# Patient Record
Sex: FEMALE | Race: WHITE | NOT HISPANIC OR LATINO | URBAN - METROPOLITAN AREA
[De-identification: names, ages, dates, MRNs, and addresses within clinical notes are randomized per-mention and may not be internally consistent; named-entity substitution may affect disease eponyms.]

---

## 2022-12-08 ENCOUNTER — LAB OUTSIDE AN ENCOUNTER (OUTPATIENT)
Dept: URBAN - METROPOLITAN AREA CLINIC 113 | Facility: CLINIC | Age: 22
End: 2022-12-08

## 2022-12-08 ENCOUNTER — WEB ENCOUNTER (OUTPATIENT)
Dept: URBAN - METROPOLITAN AREA CLINIC 113 | Facility: CLINIC | Age: 22
End: 2022-12-08

## 2022-12-08 ENCOUNTER — OFFICE VISIT (OUTPATIENT)
Dept: URBAN - METROPOLITAN AREA CLINIC 113 | Facility: CLINIC | Age: 22
End: 2022-12-08
Payer: OTHER GOVERNMENT

## 2022-12-08 VITALS
TEMPERATURE: 97.3 F | WEIGHT: 83 LBS | DIASTOLIC BLOOD PRESSURE: 86 MMHG | SYSTOLIC BLOOD PRESSURE: 123 MMHG | HEART RATE: 119 BPM | BODY MASS INDEX: 14.71 KG/M2 | HEIGHT: 63 IN | RESPIRATION RATE: 16 BRPM

## 2022-12-08 DIAGNOSIS — R10.84 GENERALIZED ABDOMINAL PAIN: ICD-10-CM

## 2022-12-08 DIAGNOSIS — R63.4 WEIGHT LOSS: ICD-10-CM

## 2022-12-08 PROCEDURE — 99204 OFFICE O/P NEW MOD 45 MIN: CPT | Performed by: INTERNAL MEDICINE

## 2022-12-08 RX ORDER — MIRTAZAPINE 15 MG/1
1 TABLET AT BEDTIME TABLET, FILM COATED ORAL ONCE A DAY
Qty: 30 | Refills: 3 | OUTPATIENT
Start: 2022-12-08

## 2022-12-08 RX ORDER — HYOSCYAMINE SULFATE 0.12 MG/1
1 TABLET UNDER THE TONGUE AND ALLOW TO DISSOLVE  AS NEEDED TABLET, ORALLY DISINTEGRATING ORAL
Qty: 60 | Refills: 3 | OUTPATIENT
Start: 2022-12-08 | End: 2023-04-07

## 2022-12-09 ENCOUNTER — TELEPHONE ENCOUNTER (OUTPATIENT)
Dept: URBAN - METROPOLITAN AREA SURGERY CENTER 30 | Facility: SURGERY CENTER | Age: 22
End: 2022-12-09

## 2022-12-09 LAB
A/G RATIO: 0.9
ABSOLUTE BASOPHILS: 30
ABSOLUTE EOSINOPHILS: 23
ABSOLUTE LYMPHOCYTES: 653
ABSOLUTE MONOCYTES: 600
ABSOLUTE NEUTROPHILS: 6195
ALBUMIN: 3.5
ALKALINE PHOSPHATASE: 75
ALT (SGPT): 6
AST (SGOT): 9
BASOPHILS: 0.4
BILIRUBIN, TOTAL: 0.3
BUN/CREATININE RATIO: (no result)
BUN: 11
CALCIUM: 9.1
CARBON DIOXIDE, TOTAL: 27
CBC MORPHOLOGY: (no result)
CHLORIDE: 99
CREATININE: 0.68
EGFR: 126
EOSINOPHILS: 0.3
GLOBULIN, TOTAL: 3.9
GLUCOSE: 92
HEMATOCRIT: 27.1
HEMOGLOBIN: 8.1
LYMPHOCYTES: 8.7
MCH: 20.7
MCHC: 29.9
MCV: 69.3
MONOCYTES: 8
MPV: 9.1
NEUTROPHILS: 82.6
PLATELET COUNT: 529
POTASSIUM: 4.6
PROTEIN, TOTAL: 7.4
RDW: 16.3
RED BLOOD CELL COUNT: 3.91
SODIUM: 135
TSH: 4.39
WHITE BLOOD CELL COUNT: 7.5

## 2022-12-14 ENCOUNTER — TELEPHONE ENCOUNTER (OUTPATIENT)
Dept: URBAN - METROPOLITAN AREA CLINIC 113 | Facility: CLINIC | Age: 22
End: 2022-12-14

## 2022-12-23 ENCOUNTER — LAB OUTSIDE AN ENCOUNTER (OUTPATIENT)
Dept: URBAN - METROPOLITAN AREA CLINIC 113 | Facility: CLINIC | Age: 22
End: 2022-12-23

## 2022-12-23 ENCOUNTER — TELEPHONE ENCOUNTER (OUTPATIENT)
Dept: URBAN - METROPOLITAN AREA CLINIC 113 | Facility: CLINIC | Age: 22
End: 2022-12-23

## 2022-12-30 ENCOUNTER — WEB ENCOUNTER (OUTPATIENT)
Dept: URBAN - METROPOLITAN AREA SURGERY CENTER 25 | Facility: SURGERY CENTER | Age: 22
End: 2022-12-30

## 2023-01-04 ENCOUNTER — TELEPHONE ENCOUNTER (OUTPATIENT)
Dept: URBAN - METROPOLITAN AREA CLINIC 113 | Facility: CLINIC | Age: 23
End: 2023-01-04

## 2023-01-04 ENCOUNTER — CLAIMS CREATED FROM THE CLAIM WINDOW (OUTPATIENT)
Dept: URBAN - METROPOLITAN AREA CLINIC 4 | Facility: CLINIC | Age: 23
End: 2023-01-04
Payer: OTHER GOVERNMENT

## 2023-01-04 ENCOUNTER — OFFICE VISIT (OUTPATIENT)
Dept: URBAN - METROPOLITAN AREA SURGERY CENTER 25 | Facility: SURGERY CENTER | Age: 23
End: 2023-01-04
Payer: OTHER GOVERNMENT

## 2023-01-04 DIAGNOSIS — K63.89 OTHER SPECIFIED DISEASES OF INTESTINE: ICD-10-CM

## 2023-01-04 DIAGNOSIS — R10.84 ABDOMINAL PAIN, GENERALIZED: ICD-10-CM

## 2023-01-04 DIAGNOSIS — K56.699 OTHER INTESTINAL OBSTRUCTION UNSPECIFIED AS TO PARTIAL VERSUS COMPLETE OBSTRUCTION: ICD-10-CM

## 2023-01-04 DIAGNOSIS — R93.3 ABN FINDINGS-GI TRACT: ICD-10-CM

## 2023-01-04 PROCEDURE — 88305 TISSUE EXAM BY PATHOLOGIST: CPT | Performed by: PATHOLOGY

## 2023-01-04 PROCEDURE — G8907 PT DOC NO EVENTS ON DISCHARG: HCPCS | Performed by: INTERNAL MEDICINE

## 2023-01-04 PROCEDURE — 45380 COLONOSCOPY AND BIOPSY: CPT | Performed by: INTERNAL MEDICINE

## 2023-01-04 RX ORDER — MIRTAZAPINE 15 MG/1
1 TABLET AT BEDTIME TABLET, FILM COATED ORAL ONCE A DAY
Qty: 30 | Refills: 3 | Status: ACTIVE | COMMUNITY
Start: 2022-12-08

## 2023-01-04 RX ORDER — HYOSCYAMINE SULFATE 0.12 MG/1
1 TABLET UNDER THE TONGUE AND ALLOW TO DISSOLVE  AS NEEDED TABLET, ORALLY DISINTEGRATING ORAL
Qty: 60 | Refills: 3 | Status: ACTIVE | COMMUNITY
Start: 2022-12-08 | End: 2023-04-07

## 2023-01-06 ENCOUNTER — OFFICE VISIT (OUTPATIENT)
Dept: URBAN - METROPOLITAN AREA CLINIC 113 | Facility: CLINIC | Age: 23
End: 2023-01-06

## 2023-01-15 ENCOUNTER — TELEPHONE ENCOUNTER (OUTPATIENT)
Dept: URBAN - METROPOLITAN AREA CLINIC 113 | Facility: CLINIC | Age: 23
End: 2023-01-15

## 2023-01-25 ENCOUNTER — OFFICE VISIT (OUTPATIENT)
Dept: URBAN - METROPOLITAN AREA CLINIC 107 | Facility: CLINIC | Age: 23
End: 2023-01-25
Payer: OTHER GOVERNMENT

## 2023-01-25 VITALS
WEIGHT: 85 LBS | DIASTOLIC BLOOD PRESSURE: 91 MMHG | RESPIRATION RATE: 16 BRPM | HEART RATE: 126 BPM | BODY MASS INDEX: 15.06 KG/M2 | TEMPERATURE: 96.6 F | HEIGHT: 63 IN | SYSTOLIC BLOOD PRESSURE: 133 MMHG

## 2023-01-25 DIAGNOSIS — R93.5 ABNORMAL CT OF THE ABDOMEN: ICD-10-CM

## 2023-01-25 DIAGNOSIS — R10.84 GENERALIZED ABDOMINAL PAIN: ICD-10-CM

## 2023-01-25 DIAGNOSIS — R63.4 WEIGHT LOSS: ICD-10-CM

## 2023-01-25 PROCEDURE — 99214 OFFICE O/P EST MOD 30 MIN: CPT | Performed by: INTERNAL MEDICINE

## 2023-01-25 RX ORDER — MIRTAZAPINE 15 MG/1
1 TABLET AT BEDTIME TABLET, FILM COATED ORAL ONCE A DAY
Qty: 30 | Refills: 3 | Status: ACTIVE | COMMUNITY
Start: 2022-12-08

## 2023-01-25 RX ORDER — HYOSCYAMINE SULFATE 0.12 MG/1
1 TABLET UNDER THE TONGUE AND ALLOW TO DISSOLVE  AS NEEDED TABLET, ORALLY DISINTEGRATING ORAL
Qty: 60 | Refills: 3 | Status: ACTIVE | COMMUNITY
Start: 2022-12-08 | End: 2023-04-07

## 2023-01-25 NOTE — HPI-TODAY'S VISIT:
Patient returns today in follow-up.  She has been seen for generalized abdominal pain some nausea vomiting and weight loss.  There was concern for some anorexia.  However work-up demonstrated inflammatory process in the right lower quadrant.  She had hydronephrosis.  She has been seen by Dr. Todd and it sounds like ureteral stents are planned upcoming.  Colonoscopy was performed showing some nonspecific inflammation.  Biopsies from the terminal ileum and colon were obtained and did not show any evidence of Crohn's disease.  She is up 2 pounds since her last visit.  However she continues to have pain and decreased intake.

## 2023-01-25 NOTE — PHYSICAL EXAM GASTROINTESTINAL
Abdomen is soft, mildly tender RLQ>[id number], nondistended , no rebound or guarding. No organomegaly appreciated

## 2023-02-02 ENCOUNTER — CLAIMS CREATED FROM THE CLAIM WINDOW (OUTPATIENT)
Dept: URBAN - METROPOLITAN AREA MEDICAL CENTER 19 | Facility: MEDICAL CENTER | Age: 23
End: 2023-02-02
Payer: OTHER GOVERNMENT

## 2023-02-02 DIAGNOSIS — R93.3 ABN FINDINGS-GI TRACT: ICD-10-CM

## 2023-02-02 DIAGNOSIS — R63.4 ABNORMAL INTENTIONAL WEIGHT LOSS: ICD-10-CM

## 2023-02-02 DIAGNOSIS — R19.7 ACUTE DIARRHEA: ICD-10-CM

## 2023-02-02 PROCEDURE — 99222 1ST HOSP IP/OBS MODERATE 55: CPT | Performed by: INTERNAL MEDICINE

## 2023-02-02 PROCEDURE — 99254 IP/OBS CNSLTJ NEW/EST MOD 60: CPT | Performed by: INTERNAL MEDICINE

## 2023-02-04 ENCOUNTER — CLAIMS CREATED FROM THE CLAIM WINDOW (OUTPATIENT)
Dept: URBAN - METROPOLITAN AREA MEDICAL CENTER 19 | Facility: MEDICAL CENTER | Age: 23
End: 2023-02-04
Payer: OTHER GOVERNMENT

## 2023-02-04 DIAGNOSIS — N13.30 HYDRONEPHROSIS: ICD-10-CM

## 2023-02-04 DIAGNOSIS — R10.84 GENERALIZED ABDOMINAL PAIN: ICD-10-CM

## 2023-02-04 DIAGNOSIS — R93.3 ABN FINDINGS-GI TRACT: ICD-10-CM

## 2023-02-04 DIAGNOSIS — D50.9 ANEMIA: ICD-10-CM

## 2023-02-04 PROCEDURE — 99232 SBSQ HOSP IP/OBS MODERATE 35: CPT | Performed by: INTERNAL MEDICINE

## 2023-02-05 ENCOUNTER — CLAIMS CREATED FROM THE CLAIM WINDOW (OUTPATIENT)
Dept: URBAN - METROPOLITAN AREA MEDICAL CENTER 19 | Facility: MEDICAL CENTER | Age: 23
End: 2023-02-05
Payer: OTHER GOVERNMENT

## 2023-02-05 DIAGNOSIS — D50.9 ANEMIA: ICD-10-CM

## 2023-02-05 DIAGNOSIS — N13.30 HYDRONEPHROSIS: ICD-10-CM

## 2023-02-05 DIAGNOSIS — R10.31 RLQ ABDOMINAL PAIN: ICD-10-CM

## 2023-02-05 DIAGNOSIS — R93.3 ABN FINDINGS-GI TRACT: ICD-10-CM

## 2023-02-05 PROCEDURE — 99232 SBSQ HOSP IP/OBS MODERATE 35: CPT | Performed by: INTERNAL MEDICINE

## 2023-02-06 ENCOUNTER — CLAIMS CREATED FROM THE CLAIM WINDOW (OUTPATIENT)
Dept: URBAN - METROPOLITAN AREA MEDICAL CENTER 19 | Facility: MEDICAL CENTER | Age: 23
End: 2023-02-06
Payer: OTHER GOVERNMENT

## 2023-02-06 DIAGNOSIS — N13.30 ACQUIRED HYDRONEPHROSIS: ICD-10-CM

## 2023-02-06 DIAGNOSIS — K52.89 (LYMPHOCYTIC) MICROSCOPIC COLITIS: ICD-10-CM

## 2023-02-06 DIAGNOSIS — D50.9 ANEMIA: ICD-10-CM

## 2023-02-06 PROCEDURE — 99232 SBSQ HOSP IP/OBS MODERATE 35: CPT | Performed by: STUDENT IN AN ORGANIZED HEALTH CARE EDUCATION/TRAINING PROGRAM

## 2023-02-07 ENCOUNTER — CLAIMS CREATED FROM THE CLAIM WINDOW (OUTPATIENT)
Dept: URBAN - METROPOLITAN AREA MEDICAL CENTER 19 | Facility: MEDICAL CENTER | Age: 23
End: 2023-02-07
Payer: OTHER GOVERNMENT

## 2023-02-07 DIAGNOSIS — D50.9 ANEMIA: ICD-10-CM

## 2023-02-07 DIAGNOSIS — R93.3 ABNORMAL FINDINGS ON DIAGNOSTIC IMAGING OF OTHER PARTS OF DIGESTIVE TRACT: ICD-10-CM

## 2023-02-07 DIAGNOSIS — N13.30 HYDRONEPHROSIS: ICD-10-CM

## 2023-02-07 PROCEDURE — 99232 SBSQ HOSP IP/OBS MODERATE 35: CPT | Performed by: INTERNAL MEDICINE

## 2023-02-09 ENCOUNTER — CLAIMS CREATED FROM THE CLAIM WINDOW (OUTPATIENT)
Dept: URBAN - METROPOLITAN AREA MEDICAL CENTER 19 | Facility: MEDICAL CENTER | Age: 23
End: 2023-02-09
Payer: OTHER GOVERNMENT

## 2023-02-09 DIAGNOSIS — N13.30 HYDRONEPHROSIS: ICD-10-CM

## 2023-02-09 DIAGNOSIS — R93.3 ABNORMAL FINDINGS ON DIAGNOSTIC IMAGING OF OTHER PARTS OF DIGESTIVE TRACT: ICD-10-CM

## 2023-02-09 DIAGNOSIS — R63.6 UNDERWEIGHT: ICD-10-CM

## 2023-02-09 DIAGNOSIS — K50.018 CROHN'S DISEASE OF DUODENUM WITH OTHER COMPLICATION: ICD-10-CM

## 2023-02-09 DIAGNOSIS — R10.813 RLQ ABDOMINAL TENDERNESS: ICD-10-CM

## 2023-02-09 DIAGNOSIS — D50.9 IRON DEFICIENCY ANEMIA, UNSPECIFIED: ICD-10-CM

## 2023-02-09 PROCEDURE — 99232 SBSQ HOSP IP/OBS MODERATE 35: CPT | Performed by: INTERNAL MEDICINE

## 2023-02-10 ENCOUNTER — CLAIMS CREATED FROM THE CLAIM WINDOW (OUTPATIENT)
Dept: URBAN - METROPOLITAN AREA MEDICAL CENTER 19 | Facility: MEDICAL CENTER | Age: 23
End: 2023-02-10
Payer: OTHER GOVERNMENT

## 2023-02-10 DIAGNOSIS — D50.9 ANEMIA: ICD-10-CM

## 2023-02-10 DIAGNOSIS — R93.3 ABN FINDINGS-GI TRACT: ICD-10-CM

## 2023-02-10 DIAGNOSIS — R19.7 ACUTE DIARRHEA: ICD-10-CM

## 2023-02-10 DIAGNOSIS — K50.018 CROHN'S DISEASE OF DUODENUM WITH OTHER COMPLICATION: ICD-10-CM

## 2023-02-10 DIAGNOSIS — N13.30 HYDRONEPHROSIS: ICD-10-CM

## 2023-02-10 PROCEDURE — 99233 SBSQ HOSP IP/OBS HIGH 50: CPT | Performed by: INTERNAL MEDICINE

## 2023-02-10 PROCEDURE — 99232 SBSQ HOSP IP/OBS MODERATE 35: CPT | Performed by: INTERNAL MEDICINE

## 2023-02-11 ENCOUNTER — CLAIMS CREATED FROM THE CLAIM WINDOW (OUTPATIENT)
Dept: URBAN - METROPOLITAN AREA MEDICAL CENTER 19 | Facility: MEDICAL CENTER | Age: 23
End: 2023-02-11
Payer: OTHER GOVERNMENT

## 2023-02-11 DIAGNOSIS — E46 PROTEIN CALORIE MALNUTRITION: ICD-10-CM

## 2023-02-11 DIAGNOSIS — K50.018 CROHN'S DISEASE OF SMALL INTESTINE WITH OTHER COMPLICATION: ICD-10-CM

## 2023-02-11 DIAGNOSIS — R19.7 ACUTE DIARRHEA: ICD-10-CM

## 2023-02-11 PROCEDURE — 99231 SBSQ HOSP IP/OBS SF/LOW 25: CPT | Performed by: INTERNAL MEDICINE

## 2023-02-11 PROCEDURE — 99232 SBSQ HOSP IP/OBS MODERATE 35: CPT | Performed by: INTERNAL MEDICINE

## 2023-02-12 ENCOUNTER — CLAIMS CREATED FROM THE CLAIM WINDOW (OUTPATIENT)
Dept: URBAN - METROPOLITAN AREA MEDICAL CENTER 19 | Facility: MEDICAL CENTER | Age: 23
End: 2023-02-12
Payer: OTHER GOVERNMENT

## 2023-02-12 DIAGNOSIS — D72.829 ELEVATED WHITE BLOOD CELL COUNT: ICD-10-CM

## 2023-02-12 DIAGNOSIS — R93.3 ABN FINDINGS-GI TRACT: ICD-10-CM

## 2023-02-12 DIAGNOSIS — E46 PROTEIN CALORIE MALNUTRITION: ICD-10-CM

## 2023-02-12 DIAGNOSIS — K50.818 CROHN'S DISEASE OF BOTH SMALL AND LG INT W OTH COMPLICATION: ICD-10-CM

## 2023-02-12 PROCEDURE — 99232 SBSQ HOSP IP/OBS MODERATE 35: CPT | Performed by: INTERNAL MEDICINE

## 2023-02-13 ENCOUNTER — TELEPHONE ENCOUNTER (OUTPATIENT)
Dept: URBAN - METROPOLITAN AREA CLINIC 113 | Facility: CLINIC | Age: 23
End: 2023-02-13

## 2023-02-13 ENCOUNTER — TELEPHONE ENCOUNTER (OUTPATIENT)
Dept: URBAN - METROPOLITAN AREA CLINIC 97 | Facility: CLINIC | Age: 23
End: 2023-02-13

## 2023-02-13 ENCOUNTER — CLAIMS CREATED FROM THE CLAIM WINDOW (OUTPATIENT)
Dept: URBAN - METROPOLITAN AREA MEDICAL CENTER 19 | Facility: MEDICAL CENTER | Age: 23
End: 2023-02-13
Payer: OTHER GOVERNMENT

## 2023-02-13 DIAGNOSIS — K50.013 CROHN'S DISEASE OF SMALL INTESTINE WITH FISTULA: ICD-10-CM

## 2023-02-13 DIAGNOSIS — K50.018 CROHN'S DISEASE OF SMALL INTESTINE WITH OTHER COMPLICATION: ICD-10-CM

## 2023-02-13 DIAGNOSIS — D50.9 ANEMIA: ICD-10-CM

## 2023-02-13 DIAGNOSIS — N13.30 HYDRONEPHROSIS: ICD-10-CM

## 2023-02-13 PROCEDURE — 99232 SBSQ HOSP IP/OBS MODERATE 35: CPT | Performed by: INTERNAL MEDICINE

## 2023-02-13 RX ORDER — INFLIXIMAB 100 MG/10ML
AS DIRECTED INJECTION, POWDER, LYOPHILIZED, FOR SOLUTION INTRAVENOUS
Qty: 100 | Refills: 0 | OUTPATIENT
Start: 2023-02-13 | End: 2023-03-15

## 2023-02-14 ENCOUNTER — CLAIMS CREATED FROM THE CLAIM WINDOW (OUTPATIENT)
Dept: URBAN - METROPOLITAN AREA MEDICAL CENTER 19 | Facility: MEDICAL CENTER | Age: 23
End: 2023-02-14
Payer: OTHER GOVERNMENT

## 2023-02-14 ENCOUNTER — TELEPHONE ENCOUNTER (OUTPATIENT)
Dept: URBAN - METROPOLITAN AREA CLINIC 113 | Facility: CLINIC | Age: 23
End: 2023-02-14

## 2023-02-14 DIAGNOSIS — K50.818 CROHN'S DISEASE OF BOTH SMALL AND LARGE INTESTINE WITH OTHER COMPLICATION: ICD-10-CM

## 2023-02-14 DIAGNOSIS — K50.813 CROHN'S DISEASE OF BOTH SMALL AND LARGE INTESTINE WITH FISTULA: ICD-10-CM

## 2023-02-14 DIAGNOSIS — M25.50 ARTHRALGIA OF MULTIPLE SITES: ICD-10-CM

## 2023-02-14 PROCEDURE — 99232 SBSQ HOSP IP/OBS MODERATE 35: CPT | Performed by: STUDENT IN AN ORGANIZED HEALTH CARE EDUCATION/TRAINING PROGRAM

## 2023-02-21 ENCOUNTER — TELEPHONE ENCOUNTER (OUTPATIENT)
Dept: URBAN - METROPOLITAN AREA CLINIC 113 | Facility: CLINIC | Age: 23
End: 2023-02-21

## 2023-02-21 PROBLEM — 1085871000119101 FISTULA OF SMALL INTESTINE DUE TO CROHN'S DISEASE: Status: ACTIVE | Noted: 2023-02-21

## 2023-02-21 PROBLEM — 111583006 LEUKOCYTOSIS: Status: ACTIVE | Noted: 2023-02-21

## 2023-02-21 PROBLEM — 271737000 ANEMIA: Status: ACTIVE | Noted: 2023-02-16

## 2023-02-21 PROBLEM — 56689002 CROHN'S DISEASE OF SMALL INTESTINE: Status: ACTIVE | Noted: 2023-02-21

## 2023-02-21 PROBLEM — 71833008 CROHN'S DISEASE OF SMALL AND LARGE INTESTINES: Status: ACTIVE | Noted: 2023-02-21

## 2023-02-21 PROBLEM — 238107002 PROTEIN CALORIE MALNUTRITION: Status: ACTIVE | Noted: 2023-02-21

## 2023-02-21 RX ORDER — PREDNISONE 10 MG/1
TAKE 3 ONCE A DAY FOR 1 WEEK THEN 2 DAILY TABLET ORAL ONCE A DAY
Qty: 100 | Refills: 1 | OUTPATIENT

## 2023-02-22 ENCOUNTER — TELEPHONE ENCOUNTER (OUTPATIENT)
Dept: URBAN - METROPOLITAN AREA CLINIC 112 | Facility: CLINIC | Age: 23
End: 2023-02-22

## 2023-03-01 ENCOUNTER — TELEPHONE ENCOUNTER (OUTPATIENT)
Dept: URBAN - METROPOLITAN AREA CLINIC 112 | Facility: CLINIC | Age: 23
End: 2023-03-01

## 2023-03-07 ENCOUNTER — OFFICE VISIT (OUTPATIENT)
Dept: URBAN - METROPOLITAN AREA CLINIC 107 | Facility: CLINIC | Age: 23
End: 2023-03-07
Payer: OTHER GOVERNMENT

## 2023-03-07 VITALS
DIASTOLIC BLOOD PRESSURE: 86 MMHG | RESPIRATION RATE: 20 BRPM | BODY MASS INDEX: 19.67 KG/M2 | HEART RATE: 117 BPM | HEIGHT: 63 IN | WEIGHT: 111 LBS | TEMPERATURE: 98.2 F | SYSTOLIC BLOOD PRESSURE: 135 MMHG

## 2023-03-07 DIAGNOSIS — R93.5 ABNORMAL CT OF THE ABDOMEN: ICD-10-CM

## 2023-03-07 DIAGNOSIS — R63.4 WEIGHT LOSS: ICD-10-CM

## 2023-03-07 DIAGNOSIS — K50.813 CROHN'S DISEASE OF BOTH SMALL AND LARGE INTESTINE WITH FISTULA: ICD-10-CM

## 2023-03-07 PROBLEM — 71833008: Status: ACTIVE | Noted: 2023-02-13

## 2023-03-07 PROCEDURE — 99214 OFFICE O/P EST MOD 30 MIN: CPT | Performed by: INTERNAL MEDICINE

## 2023-03-07 RX ORDER — MIRTAZAPINE 15 MG/1
1 TABLET AT BEDTIME TABLET, FILM COATED ORAL ONCE A DAY
Qty: 30 | Refills: 3 | Status: ACTIVE | COMMUNITY
Start: 2022-12-08

## 2023-03-07 RX ORDER — PREDNISONE 10 MG/1
TAKE 3 ONCE A DAY FOR 1 WEEK THEN 2 DAILY TABLET ORAL ONCE A DAY
Qty: 100 | Refills: 1 | Status: ACTIVE | COMMUNITY

## 2023-03-07 RX ORDER — HYOSCYAMINE SULFATE 0.12 MG/1
1 TABLET UNDER THE TONGUE AND ALLOW TO DISSOLVE  AS NEEDED TABLET, ORALLY DISINTEGRATING ORAL
Qty: 60 | Refills: 3 | Status: ACTIVE | COMMUNITY
Start: 2022-12-08 | End: 2023-04-07

## 2023-03-07 RX ORDER — INFLIXIMAB 100 MG/10ML
AS DIRECTED INJECTION, POWDER, LYOPHILIZED, FOR SOLUTION INTRAVENOUS
Qty: 100 | Refills: 0 | Status: ACTIVE | COMMUNITY
Start: 2023-02-13 | End: 2023-03-15

## 2023-03-07 RX ORDER — MIRTAZAPINE 15 MG/1
1 TABLET AT BEDTIME TABLET, FILM COATED ORAL ONCE A DAY
Qty: 30 | Refills: 3
Start: 2022-12-08

## 2023-03-07 NOTE — HPI-TODAY'S VISIT:
Patient returns today in follow-up.  She is doing very well.  She is eating better.  She is putting on weight.  She denies any significant abdominal pain.  She is having regular bowel movements without any bleeding.  She remains on 20 mg of prednisone currently.  She has been approved for Remicade based on a peer to peer I did on Friday.  She continues to receive TPN with home health coming out once a week.  She is doing very well with this.  She follow-up with Dr. Recinos who wants to see her back after she has received a few doses of Remicade.

## 2023-03-10 ENCOUNTER — OFFICE VISIT (OUTPATIENT)
Dept: URBAN - METROPOLITAN AREA CLINIC 112 | Facility: CLINIC | Age: 23
End: 2023-03-10
Payer: OTHER GOVERNMENT

## 2023-03-10 ENCOUNTER — TELEPHONE ENCOUNTER (OUTPATIENT)
Dept: URBAN - METROPOLITAN AREA CLINIC 113 | Facility: CLINIC | Age: 23
End: 2023-03-10

## 2023-03-10 VITALS
TEMPERATURE: 98.4 F | SYSTOLIC BLOOD PRESSURE: 119 MMHG | HEIGHT: 63 IN | WEIGHT: 112 LBS | DIASTOLIC BLOOD PRESSURE: 78 MMHG | HEART RATE: 112 BPM | RESPIRATION RATE: 16 BRPM | BODY MASS INDEX: 19.84 KG/M2

## 2023-03-10 DIAGNOSIS — K50.80 CROHN'S COLITIS: ICD-10-CM

## 2023-03-10 PROCEDURE — 96413 CHEMO IV INFUSION 1 HR: CPT | Performed by: INTERNAL MEDICINE

## 2023-03-10 PROCEDURE — 96415 CHEMO IV INFUSION ADDL HR: CPT | Performed by: INTERNAL MEDICINE

## 2023-03-10 RX ORDER — MIRTAZAPINE 15 MG/1
1 TABLET AT BEDTIME TABLET, FILM COATED ORAL ONCE A DAY
Qty: 30 | Refills: 3 | Status: ACTIVE | COMMUNITY
Start: 2022-12-08

## 2023-03-10 RX ORDER — HYOSCYAMINE SULFATE 0.12 MG/1
1 TABLET UNDER THE TONGUE AND ALLOW TO DISSOLVE  AS NEEDED TABLET, ORALLY DISINTEGRATING ORAL
Qty: 60 | Refills: 3 | Status: ACTIVE | COMMUNITY
Start: 2022-12-08 | End: 2023-04-07

## 2023-03-10 RX ORDER — PREDNISONE 10 MG/1
TAKE 3 ONCE A DAY FOR 1 WEEK THEN 2 DAILY TABLET ORAL ONCE A DAY
Qty: 100 | Refills: 1 | Status: ACTIVE | COMMUNITY

## 2023-03-10 RX ORDER — INFLIXIMAB 100 MG/10ML
AS DIRECTED INJECTION, POWDER, LYOPHILIZED, FOR SOLUTION INTRAVENOUS
Qty: 100 | Refills: 0 | Status: ACTIVE | COMMUNITY
Start: 2023-02-13 | End: 2023-03-15

## 2023-03-23 ENCOUNTER — OFFICE VISIT (OUTPATIENT)
Dept: URBAN - METROPOLITAN AREA CLINIC 112 | Facility: CLINIC | Age: 23
End: 2023-03-23

## 2023-03-23 ENCOUNTER — OFFICE VISIT (OUTPATIENT)
Dept: URBAN - METROPOLITAN AREA CLINIC 112 | Facility: CLINIC | Age: 23
End: 2023-03-23
Payer: OTHER GOVERNMENT

## 2023-03-23 VITALS
BODY MASS INDEX: 19.84 KG/M2 | HEIGHT: 63 IN | TEMPERATURE: 98.4 F | SYSTOLIC BLOOD PRESSURE: 112 MMHG | DIASTOLIC BLOOD PRESSURE: 69 MMHG | WEIGHT: 112 LBS | RESPIRATION RATE: 18 BRPM | HEART RATE: 97 BPM

## 2023-03-23 DIAGNOSIS — K50.80 CROHN'S COLITIS: ICD-10-CM

## 2023-03-23 PROCEDURE — 96415 CHEMO IV INFUSION ADDL HR: CPT | Performed by: INTERNAL MEDICINE

## 2023-03-23 PROCEDURE — 96413 CHEMO IV INFUSION 1 HR: CPT | Performed by: INTERNAL MEDICINE

## 2023-03-23 RX ORDER — PREDNISONE 10 MG/1
TAKE 3 ONCE A DAY FOR 1 WEEK THEN 2 DAILY TABLET ORAL ONCE A DAY
Qty: 100 | Refills: 1 | Status: ACTIVE | COMMUNITY

## 2023-03-23 RX ORDER — HYOSCYAMINE SULFATE 0.12 MG/1
1 TABLET UNDER THE TONGUE AND ALLOW TO DISSOLVE  AS NEEDED TABLET, ORALLY DISINTEGRATING ORAL
Qty: 60 | Refills: 3 | Status: ACTIVE | COMMUNITY
Start: 2022-12-08 | End: 2023-04-07

## 2023-03-23 RX ORDER — MIRTAZAPINE 15 MG/1
1 TABLET AT BEDTIME TABLET, FILM COATED ORAL ONCE A DAY
Qty: 30 | Refills: 3 | Status: ACTIVE | COMMUNITY
Start: 2022-12-08

## 2023-04-04 ENCOUNTER — ERX REFILL RESPONSE (OUTPATIENT)
Dept: URBAN - METROPOLITAN AREA CLINIC 113 | Facility: CLINIC | Age: 23
End: 2023-04-04

## 2023-04-04 RX ORDER — MIRTAZAPINE 15 MG/1
1 TABLET AT BEDTIME TABLET, FILM COATED ORAL ONCE A DAY
Qty: 30 | Refills: 3 | OUTPATIENT

## 2023-04-04 RX ORDER — MIRTAZAPINE 15 MG/1
TAKE 1 TABLET BY MOUTH EVERY DAY AT BEDTIME FOR 30 DAYS TABLET ORAL
Qty: 90 TABLET | Refills: 2 | OUTPATIENT

## 2023-04-13 ENCOUNTER — OFFICE VISIT (OUTPATIENT)
Dept: URBAN - METROPOLITAN AREA CLINIC 107 | Facility: CLINIC | Age: 23
End: 2023-04-13

## 2023-04-13 RX ORDER — MIRTAZAPINE 15 MG/1
TAKE 1 TABLET BY MOUTH EVERY DAY AT BEDTIME FOR 30 DAYS TABLET ORAL
Qty: 90 TABLET | Refills: 2 | Status: ACTIVE | COMMUNITY

## 2023-04-13 RX ORDER — PREDNISONE 10 MG/1
TAKE 3 ONCE A DAY FOR 1 WEEK THEN 2 DAILY TABLET ORAL ONCE A DAY
Qty: 100 | Refills: 1 | Status: ACTIVE | COMMUNITY

## 2023-04-20 ENCOUNTER — OFFICE VISIT (OUTPATIENT)
Dept: URBAN - METROPOLITAN AREA CLINIC 112 | Facility: CLINIC | Age: 23
End: 2023-04-20
Payer: OTHER GOVERNMENT

## 2023-04-20 ENCOUNTER — TELEPHONE ENCOUNTER (OUTPATIENT)
Dept: URBAN - METROPOLITAN AREA CLINIC 113 | Facility: CLINIC | Age: 23
End: 2023-04-20

## 2023-04-20 VITALS
WEIGHT: 118 LBS | HEIGHT: 63 IN | TEMPERATURE: 98.3 F | SYSTOLIC BLOOD PRESSURE: 116 MMHG | HEART RATE: 84 BPM | BODY MASS INDEX: 20.91 KG/M2 | RESPIRATION RATE: 18 BRPM | DIASTOLIC BLOOD PRESSURE: 71 MMHG

## 2023-04-20 DIAGNOSIS — K50.80 CROHN'S COLITIS: ICD-10-CM

## 2023-04-20 PROCEDURE — 96415 CHEMO IV INFUSION ADDL HR: CPT | Performed by: INTERNAL MEDICINE

## 2023-04-20 PROCEDURE — 96413 CHEMO IV INFUSION 1 HR: CPT | Performed by: INTERNAL MEDICINE

## 2023-04-20 RX ORDER — MIRTAZAPINE 15 MG/1
TAKE 1 TABLET BY MOUTH EVERY DAY AT BEDTIME FOR 30 DAYS TABLET ORAL
Qty: 90 TABLET | Refills: 2 | Status: ACTIVE | COMMUNITY

## 2023-04-20 RX ORDER — PREDNISONE 10 MG/1
TAKE 3 ONCE A DAY FOR 1 WEEK THEN 2 DAILY TABLET ORAL ONCE A DAY
Qty: 100 | Refills: 1 | Status: ACTIVE | COMMUNITY

## 2023-05-12 ENCOUNTER — OFFICE VISIT (OUTPATIENT)
Dept: URBAN - METROPOLITAN AREA CLINIC 107 | Facility: CLINIC | Age: 23
End: 2023-05-12

## 2023-06-07 ENCOUNTER — TELEPHONE ENCOUNTER (OUTPATIENT)
Dept: URBAN - METROPOLITAN AREA CLINIC 113 | Facility: CLINIC | Age: 23
End: 2023-06-07

## 2023-06-16 ENCOUNTER — OFFICE VISIT (OUTPATIENT)
Dept: URBAN - METROPOLITAN AREA CLINIC 112 | Facility: CLINIC | Age: 23
End: 2023-06-16
Payer: OTHER GOVERNMENT

## 2023-06-16 VITALS
RESPIRATION RATE: 16 BRPM | BODY MASS INDEX: 21.09 KG/M2 | DIASTOLIC BLOOD PRESSURE: 63 MMHG | WEIGHT: 119 LBS | HEART RATE: 88 BPM | HEIGHT: 63 IN | TEMPERATURE: 97.2 F | SYSTOLIC BLOOD PRESSURE: 108 MMHG

## 2023-06-16 DIAGNOSIS — K50.80 CROHN'S COLITIS: ICD-10-CM

## 2023-06-16 PROCEDURE — 96415 CHEMO IV INFUSION ADDL HR: CPT | Performed by: INTERNAL MEDICINE

## 2023-06-16 PROCEDURE — 96413 CHEMO IV INFUSION 1 HR: CPT | Performed by: INTERNAL MEDICINE

## 2023-06-16 RX ORDER — MIRTAZAPINE 15 MG/1
TAKE 1 TABLET BY MOUTH EVERY DAY AT BEDTIME FOR 30 DAYS TABLET ORAL
Qty: 90 TABLET | Refills: 2 | Status: ACTIVE | COMMUNITY

## 2023-06-16 RX ORDER — PREDNISONE 10 MG/1
TAKE 3 ONCE A DAY FOR 1 WEEK THEN 2 DAILY TABLET ORAL ONCE A DAY
Qty: 100 | Refills: 1 | Status: ACTIVE | COMMUNITY

## 2023-06-26 ENCOUNTER — LAB OUTSIDE AN ENCOUNTER (OUTPATIENT)
Dept: URBAN - METROPOLITAN AREA CLINIC 113 | Facility: CLINIC | Age: 23
End: 2023-06-26

## 2023-07-27 ENCOUNTER — WEB ENCOUNTER (OUTPATIENT)
Dept: URBAN - METROPOLITAN AREA CLINIC 107 | Facility: CLINIC | Age: 23
End: 2023-07-27

## 2023-07-27 ENCOUNTER — OFFICE VISIT (OUTPATIENT)
Dept: URBAN - METROPOLITAN AREA CLINIC 107 | Facility: CLINIC | Age: 23
End: 2023-07-27
Payer: OTHER GOVERNMENT

## 2023-07-27 VITALS
RESPIRATION RATE: 16 BRPM | WEIGHT: 114 LBS | DIASTOLIC BLOOD PRESSURE: 73 MMHG | TEMPERATURE: 97.5 F | SYSTOLIC BLOOD PRESSURE: 113 MMHG | HEART RATE: 98 BPM | BODY MASS INDEX: 20.2 KG/M2 | HEIGHT: 63 IN

## 2023-07-27 DIAGNOSIS — K50.813 CROHN'S DISEASE OF BOTH SMALL AND LARGE INTESTINE WITH FISTULA: ICD-10-CM

## 2023-07-27 DIAGNOSIS — Z79.899 HIGH RISK MEDICATIONS (NOT ANTICOAGULANTS) LONG-TERM USE: ICD-10-CM

## 2023-07-27 DIAGNOSIS — R63.4 WEIGHT LOSS: ICD-10-CM

## 2023-07-27 PROCEDURE — 99214 OFFICE O/P EST MOD 30 MIN: CPT | Performed by: INTERNAL MEDICINE

## 2023-07-27 RX ORDER — MIRTAZAPINE 15 MG/1
TAKE 1 TABLET BY MOUTH EVERY DAY AT BEDTIME FOR 30 DAYS TABLET ORAL
Qty: 90 TABLET | Refills: 2 | Status: ON HOLD | COMMUNITY

## 2023-07-27 RX ORDER — PREDNISONE 10 MG/1
TAKE 3 ONCE A DAY FOR 1 WEEK THEN 2 DAILY TABLET ORAL ONCE A DAY
Qty: 100 | Refills: 1 | Status: ON HOLD | COMMUNITY

## 2023-07-27 NOTE — HPI-TODAY'S VISIT:
Patient presents today in follow-up.  She is doing great.  2 bowel movements per day.  No bleeding.  No abdominal pain.  She continues to put on weight.  No difficulties with the Remicade infusions.  She does have some complaints of some arthropathies or joint complaints.  Points to her knees.  Does not seem to be correlated to the timing of her infusions.  No other new complaints.  Specifically denies fevers or cough

## 2023-08-04 ENCOUNTER — OFFICE VISIT (OUTPATIENT)
Dept: URBAN - METROPOLITAN AREA CLINIC 113 | Facility: CLINIC | Age: 23
End: 2023-08-04

## 2023-08-11 ENCOUNTER — OFFICE VISIT (OUTPATIENT)
Dept: URBAN - METROPOLITAN AREA CLINIC 112 | Facility: CLINIC | Age: 23
End: 2023-08-11

## 2023-08-14 ENCOUNTER — OFFICE VISIT (OUTPATIENT)
Dept: URBAN - METROPOLITAN AREA CLINIC 112 | Facility: CLINIC | Age: 23
End: 2023-08-14
Payer: OTHER GOVERNMENT

## 2023-08-14 VITALS
WEIGHT: 117 LBS | TEMPERATURE: 98.7 F | HEART RATE: 80 BPM | RESPIRATION RATE: 16 BRPM | SYSTOLIC BLOOD PRESSURE: 113 MMHG | BODY MASS INDEX: 20.73 KG/M2 | HEIGHT: 63 IN | DIASTOLIC BLOOD PRESSURE: 70 MMHG

## 2023-08-14 DIAGNOSIS — K50.80 CROHN'S COLITIS: ICD-10-CM

## 2023-08-14 PROCEDURE — 96415 CHEMO IV INFUSION ADDL HR: CPT | Performed by: INTERNAL MEDICINE

## 2023-08-14 PROCEDURE — 96413 CHEMO IV INFUSION 1 HR: CPT | Performed by: INTERNAL MEDICINE

## 2023-08-14 RX ORDER — MIRTAZAPINE 15 MG/1
TAKE 1 TABLET BY MOUTH EVERY DAY AT BEDTIME FOR 30 DAYS TABLET ORAL
Qty: 90 TABLET | Refills: 2 | Status: ON HOLD | COMMUNITY

## 2023-08-14 RX ORDER — PREDNISONE 10 MG/1
TAKE 3 ONCE A DAY FOR 1 WEEK THEN 2 DAILY TABLET ORAL ONCE A DAY
Qty: 100 | Refills: 1 | Status: ON HOLD | COMMUNITY

## 2023-08-21 LAB
COMMENT: (no result)
INFLIXIMAB AB, IBD: >100
INTERPRETATION: (no result)

## 2023-08-28 ENCOUNTER — OFFICE VISIT (OUTPATIENT)
Dept: URBAN - METROPOLITAN AREA CLINIC 107 | Facility: CLINIC | Age: 23
End: 2023-08-28
Payer: OTHER GOVERNMENT

## 2023-08-28 ENCOUNTER — DASHBOARD ENCOUNTERS (OUTPATIENT)
Age: 23
End: 2023-08-28

## 2023-08-28 VITALS
WEIGHT: 113 LBS | HEART RATE: 103 BPM | BODY MASS INDEX: 20.02 KG/M2 | TEMPERATURE: 97.1 F | RESPIRATION RATE: 16 BRPM | HEIGHT: 63 IN | SYSTOLIC BLOOD PRESSURE: 108 MMHG | DIASTOLIC BLOOD PRESSURE: 62 MMHG

## 2023-08-28 DIAGNOSIS — R93.5 ABNORMAL CT OF THE ABDOMEN: ICD-10-CM

## 2023-08-28 DIAGNOSIS — R63.4 WEIGHT LOSS: ICD-10-CM

## 2023-08-28 DIAGNOSIS — K50.813 CROHN'S DISEASE OF BOTH SMALL AND LARGE INTESTINE W FISTULA: ICD-10-CM

## 2023-08-28 PROCEDURE — 99214 OFFICE O/P EST MOD 30 MIN: CPT | Performed by: INTERNAL MEDICINE

## 2023-08-28 RX ORDER — MIRTAZAPINE 15 MG/1
TAKE 1 TABLET BY MOUTH EVERY DAY AT BEDTIME FOR 30 DAYS TABLET ORAL
Qty: 90 TABLET | Refills: 2 | Status: ON HOLD | COMMUNITY

## 2023-08-28 RX ORDER — PREDNISONE 10 MG/1
TAKE 3 ONCE A DAY FOR 1 WEEK THEN 2 DAILY TABLET ORAL ONCE A DAY
Qty: 100 | Refills: 1 | Status: ON HOLD | COMMUNITY

## 2023-08-28 NOTE — HPI-TODAY'S VISIT:
Patient returns today in follow-up.  She has a history of small bowel Crohn's disease complicated by fistulization.  She has been doing better on Remicade.  Today the patient reports some abdominal pain cramping which has been more frequent than previous.  She is also now having joint complaints although cannot time them to her infusions.  Recent labs demonstrated positive infliximab antibodies.  Her weight remains stable.  She does acknowledge she is not doing as well she was when she first started the Remicade.  No fevers or night sweats.  No cough.

## 2023-08-31 ENCOUNTER — TELEPHONE ENCOUNTER (OUTPATIENT)
Dept: URBAN - METROPOLITAN AREA CLINIC 107 | Facility: CLINIC | Age: 23
End: 2023-08-31

## 2023-08-31 RX ORDER — MIRTAZAPINE 15 MG/1
TAKE 1 TABLET BY MOUTH EVERY DAY AT BEDTIME FOR 30 DAYS TABLET ORAL
Qty: 90 TABLET | Refills: 2 | Status: ON HOLD | COMMUNITY

## 2023-08-31 RX ORDER — RISANKIZUMAB-RZAA 60 MG/ML
600MG INJECTION INTRAVENOUS
Qty: 600 UNSPECIFIED | Refills: 2 | OUTPATIENT
Start: 2023-09-05 | End: 2024-06-01

## 2023-08-31 RX ORDER — PREDNISONE 10 MG/1
TAKE 3 ONCE A DAY FOR 1 WEEK THEN 2 DAILY TABLET ORAL ONCE A DAY
Qty: 100 | Refills: 1 | Status: ON HOLD | COMMUNITY

## 2023-09-06 ENCOUNTER — TELEPHONE ENCOUNTER (OUTPATIENT)
Dept: URBAN - METROPOLITAN AREA CLINIC 113 | Facility: CLINIC | Age: 23
End: 2023-09-06

## 2023-09-12 LAB
A/G RATIO: 1
ABSOLUTE BASOPHILS: 40
ABSOLUTE EOSINOPHILS: 190
ABSOLUTE LYMPHOCYTES: 1610
ABSOLUTE MONOCYTES: 320
ABSOLUTE NEUTROPHILS: 2840
ALBUMIN: 4.1
ALKALINE PHOSPHATASE: 74
ALT (SGPT): 9
AST (SGOT): 15
BASOPHILS: 0.8
BILIRUBIN, TOTAL: 0.3
BUN/CREATININE RATIO: (no result)
BUN: 12
CALCIUM: 9.6
CARBON DIOXIDE, TOTAL: 24
CHLORIDE: 103
CREATININE: 0.86
EGFR: 97
EOSINOPHILS: 3.8
GLOBULIN, TOTAL: 4
GLUCOSE: 79
HEMATOCRIT: 43.9
HEMOGLOBIN: 13.2
HEPATITIS B SURFACE ANTIGEN: (no result)
LYMPHOCYTES: 32.2
MCH: 24.3
MCHC: 30.1
MCV: 80.8
MITOGEN-NIL: >10
MONOCYTES: 6.4
MPV: 11.2
NEUTROPHILS: 56.8
PLATELET COUNT: 391
POTASSIUM: 4.5
PROTEIN, TOTAL: 8.1
QUANTIFERON NIL VALUE: 0.05
QUANTIFERON TB1 AG VALUE: 0.05
QUANTIFERON TB2 AG VALUE: 0.05
QUANTIFERON-TB GOLD PLUS: NEGATIVE
RDW: 14.5
RED BLOOD CELL COUNT: 5.43
SODIUM: 137
WHITE BLOOD CELL COUNT: 5

## 2023-10-06 ENCOUNTER — OFFICE VISIT (OUTPATIENT)
Dept: URBAN - METROPOLITAN AREA CLINIC 112 | Facility: CLINIC | Age: 23
End: 2023-10-06

## 2023-10-17 ENCOUNTER — TELEPHONE ENCOUNTER (OUTPATIENT)
Dept: URBAN - METROPOLITAN AREA CLINIC 113 | Facility: CLINIC | Age: 23
End: 2023-10-17

## 2023-11-15 ENCOUNTER — TELEPHONE ENCOUNTER (OUTPATIENT)
Dept: URBAN - METROPOLITAN AREA CLINIC 113 | Facility: CLINIC | Age: 23
End: 2023-11-15

## 2023-11-15 RX ORDER — RISANKIZUMAB-RZAA 180 MG/1.2
AT WEEK 12 KIT SUBCUTANEOUS
Qty: 180 | Refills: 6 | OUTPATIENT
Start: 2023-11-15

## 2023-11-16 ENCOUNTER — TELEPHONE ENCOUNTER (OUTPATIENT)
Dept: URBAN - METROPOLITAN AREA CLINIC 113 | Facility: CLINIC | Age: 23
End: 2023-11-16

## 2023-11-20 ENCOUNTER — TELEPHONE ENCOUNTER (OUTPATIENT)
Dept: URBAN - METROPOLITAN AREA CLINIC 113 | Facility: CLINIC | Age: 23
End: 2023-11-20

## 2024-05-21 ENCOUNTER — OFFICE VISIT (OUTPATIENT)
Dept: URBAN - METROPOLITAN AREA CLINIC 113 | Facility: CLINIC | Age: 24
End: 2024-05-21

## 2024-05-30 ENCOUNTER — LAB OUTSIDE AN ENCOUNTER (OUTPATIENT)
Dept: URBAN - METROPOLITAN AREA CLINIC 113 | Facility: CLINIC | Age: 24
End: 2024-05-30

## 2024-05-30 ENCOUNTER — OFFICE VISIT (OUTPATIENT)
Dept: URBAN - METROPOLITAN AREA CLINIC 113 | Facility: CLINIC | Age: 24
End: 2024-05-30
Payer: OTHER GOVERNMENT

## 2024-05-30 VITALS
RESPIRATION RATE: 18 BRPM | DIASTOLIC BLOOD PRESSURE: 87 MMHG | WEIGHT: 120.6 LBS | HEART RATE: 79 BPM | BODY MASS INDEX: 21.37 KG/M2 | HEIGHT: 63 IN | SYSTOLIC BLOOD PRESSURE: 129 MMHG | TEMPERATURE: 97.7 F

## 2024-05-30 DIAGNOSIS — Z79.899 HIGH RISK MEDICATIONS (NOT ANTICOAGULANTS) LONG-TERM USE: ICD-10-CM

## 2024-05-30 DIAGNOSIS — R63.4 WEIGHT LOSS: ICD-10-CM

## 2024-05-30 DIAGNOSIS — K50.813 CROHN'S DISEASE OF BOTH SMALL AND LARGE INTESTINE W FISTULA: ICD-10-CM

## 2024-05-30 PROCEDURE — 99214 OFFICE O/P EST MOD 30 MIN: CPT | Performed by: INTERNAL MEDICINE

## 2024-05-30 RX ORDER — MIRTAZAPINE 15 MG/1
TAKE 1 TABLET BY MOUTH EVERY DAY AT BEDTIME FOR 30 DAYS TABLET ORAL
Qty: 90 TABLET | Refills: 2 | Status: ON HOLD | COMMUNITY

## 2024-05-30 RX ORDER — PREDNISONE 10 MG/1
TAKE 3 ONCE A DAY FOR 1 WEEK THEN 2 DAILY TABLET ORAL ONCE A DAY
Qty: 100 | Refills: 1 | Status: ON HOLD | COMMUNITY

## 2024-05-30 RX ORDER — RISANKIZUMAB-RZAA 60 MG/ML
600MG INJECTION INTRAVENOUS
Qty: 600 UNSPECIFIED | Refills: 2 | Status: ACTIVE | COMMUNITY
Start: 2023-09-05 | End: 2024-06-01

## 2024-05-30 RX ORDER — RISANKIZUMAB-RZAA 180 MG/1.2
AT WEEK 12 KIT SUBCUTANEOUS
Qty: 180 | Refills: 6 | Status: ACTIVE | COMMUNITY
Start: 2023-11-15

## 2024-08-09 ENCOUNTER — TELEPHONE ENCOUNTER (OUTPATIENT)
Dept: URBAN - METROPOLITAN AREA CLINIC 113 | Facility: CLINIC | Age: 24
End: 2024-08-09

## 2024-09-11 ENCOUNTER — OFFICE VISIT (OUTPATIENT)
Dept: URBAN - METROPOLITAN AREA CLINIC 113 | Facility: CLINIC | Age: 24
End: 2024-09-11

## 2024-09-17 ENCOUNTER — OFFICE VISIT (OUTPATIENT)
Dept: URBAN - METROPOLITAN AREA CLINIC 113 | Facility: CLINIC | Age: 24
End: 2024-09-17

## 2024-10-21 ENCOUNTER — OFFICE VISIT (OUTPATIENT)
Dept: URBAN - METROPOLITAN AREA CLINIC 113 | Facility: CLINIC | Age: 24
End: 2024-10-21
Payer: COMMERCIAL

## 2024-10-21 VITALS
WEIGHT: 122 LBS | HEART RATE: 79 BPM | TEMPERATURE: 97.5 F | BODY MASS INDEX: 21.62 KG/M2 | DIASTOLIC BLOOD PRESSURE: 69 MMHG | RESPIRATION RATE: 18 BRPM | SYSTOLIC BLOOD PRESSURE: 110 MMHG | HEIGHT: 63 IN

## 2024-10-21 DIAGNOSIS — K50.813 CROHN'S DISEASE OF BOTH SMALL AND LARGE INTESTINE W FISTULA: ICD-10-CM

## 2024-10-21 DIAGNOSIS — R10.84 GENERALIZED ABDOMINAL PAIN: ICD-10-CM

## 2024-10-21 DIAGNOSIS — Z79.899 HIGH RISK MEDICATIONS (NOT ANTICOAGULANTS) LONG-TERM USE: ICD-10-CM

## 2024-10-21 PROCEDURE — 99214 OFFICE O/P EST MOD 30 MIN: CPT | Performed by: INTERNAL MEDICINE

## 2024-10-21 RX ORDER — RISANKIZUMAB-RZAA 180 MG/1.2
AT WEEK 12 KIT SUBCUTANEOUS
Qty: 180 | Refills: 6 | Status: ACTIVE | COMMUNITY
Start: 2023-11-15

## 2024-10-21 RX ORDER — PREDNISONE 10 MG/1
TAKE 3 ONCE A DAY FOR 1 WEEK THEN 2 DAILY TABLET ORAL ONCE A DAY
Qty: 100 | Refills: 1 | Status: ON HOLD | COMMUNITY

## 2024-10-21 RX ORDER — MIRTAZAPINE 15 MG/1
TAKE 1 TABLET BY MOUTH EVERY DAY AT BEDTIME FOR 30 DAYS TABLET ORAL
Qty: 90 TABLET | Refills: 2 | Status: ON HOLD | COMMUNITY

## 2024-10-21 RX ORDER — RISANKIZUMAB-RZAA 180 MG/1.2
AT WEEK 12 KIT SUBCUTANEOUS
OUTPATIENT
Start: 2023-11-15

## 2024-10-21 NOTE — HPI-TODAY'S VISIT:
Patient returns today in follow-up.  She is doing very well.  She has been on Skyrizi and has had a good response.  She is eating well.  She is put weight on.  She has 1 or 2 bowel movements per day without any pain or bleeding.  I reviewed her MRI with her.  She had near complete resolution of the inflammation in the terminal ileum.  She has hydronephrosis and is followed by Dr. Todd.  She has had multiple stents placed.  She is following up with Dr. Todd next week.  I did provide her a copy of her MRI report.  She denies any fevers, cough or night sweats. CBC, CMP and CRP were all normal with recent lab check.

## 2024-10-24 ENCOUNTER — TELEPHONE ENCOUNTER (OUTPATIENT)
Dept: URBAN - METROPOLITAN AREA CLINIC 113 | Facility: CLINIC | Age: 24
End: 2024-10-24

## 2024-12-02 ENCOUNTER — ERX REFILL RESPONSE (OUTPATIENT)
Dept: URBAN - METROPOLITAN AREA CLINIC 113 | Facility: CLINIC | Age: 24
End: 2024-12-02

## 2024-12-02 RX ORDER — RISANKIZUMAB-RZAA 180 MG/1.2
INSERT 1 CARTRIDGE INTO ON-BODY INJECTOR AND INJECT 180 MG UNDER THE SKIN AT WEEK 12 THEN EVERY 8 WEEKS KIT SUBCUTANEOUS
Qty: 2 | Refills: 7 | OUTPATIENT

## 2024-12-02 RX ORDER — RISANKIZUMAB-RZAA 180 MG/1.2
1.2 ML KIT SUBCUTANEOUS
Qty: 1 | Refills: 6 | OUTPATIENT